# Patient Record
Sex: MALE | RURAL
[De-identification: names, ages, dates, MRNs, and addresses within clinical notes are randomized per-mention and may not be internally consistent; named-entity substitution may affect disease eponyms.]

---

## 2020-10-27 ENCOUNTER — OFFICE VISIT CONVERTED (OUTPATIENT)
Dept: CARDIOLOGY | Facility: CLINIC | Age: 45
End: 2020-10-27
Attending: INTERNAL MEDICINE

## 2021-05-10 NOTE — H&P
History and Physical      Patient Name: Jignesh Grubbs   Patient ID: [id number]   Sex: Male   YOB: 1975    Primary Care Provider: Manju Schwartz MD   Referring Provider: Manju Schwartz MD    Visit Date: October 27, 2020    Provider: Jean Claude Schofield MD   [facility]   Location Address: [address] [place] [address]  [id number]          History Of Present Illness  Consult requested by: Manju Schwartz MD   I saw Jignesh Grubbs in the office today. This is a 45 year old, white male. He has no previous cardiac history. A couple weeks ago he had an episode of dizziness and lightheadedness, and apparently his blood pressure was elevated, and this was associated with increased stress. Symptoms were moderately intense. No clear exacerbating or alleviating factors. Additionally, he reports he feels short of breath since February. At this time he had a self-limited respiratory infection. This has been fairly consistent since that time. He has had no previous cardiac workup.   PAST MEDICAL HISTORY: includes former smoking; arthritis. PAST SURGICAL HISTORY: Negative.   PSYCHOSOCIAL HISTORY: He quit smoking 3 years ago. Rare alcohol. Daily caffeine intake. He is .   FAMILY HISTORY: Positive for heart disease, diabetes and hypertension.   CURRENT MEDICATIONS: include Cyclobenzaprine 10 mg daily; Vistaril 25 mg p.r.n. The dosage and frequency of the medications were reviewed with the patient.   ALLERGIES: Tussins and certain antibiotics.       Review of Systems  · Constitutional  o Admits  o : fatigue, good general health lately, recent weight changes   · Eyes  o Denies  o : double vision, blurred vision  · HENT  o Admits  o : chronic sinus problem  o Denies  o : hearing loss or ringing, swollen glands in neck  · Cardiovascular  o Admits  o : chest pain, palpitations (fast, fluttering, or skipping beats), shortness of breath while walking or lying  flat  o Denies  o : swelling (feet, ankles, hands)  · Respiratory  o Denies  o : chronic or frequent cough, asthma or wheezing, COPD  · Gastrointestinal  o Denies  o : ulcers, nausea or vomiting  · Neurologic  o Admits  o : lightheaded or dizzy  o Denies  o : stroke, headaches  · Musculoskeletal  o Admits  o : joint pain, back pain  · Endocrine  o Denies  o : thyroid disease, diabetes, heat or cold intolerance, excessive thirst or urination  · Heme-Lymph  o Denies  o : bleeding or bruising tendency, anemia      Vitals  Date Time BP Position Site L\R Cuff Size HR RR TEMP (F) WT  HT  BMI kg/m2 BSA m2 O2 Sat FR L/min FiO2 HC       10/27/2020 12:39 /98 Sitting    54 - R   177lbs 8oz 6'   24.07 2.02       10/27/2020 12:39 /97 Sitting    56 - R                   Physical Examination  · Constitutional  o Appearance  o : Normal white male, pleasant, in no acute distress.  · Head and Face  o HEENT  o : No pallor, anicteric. Eyes normal. Moist mucous membranes.  · Neck  o Inspection/Palpation  o : Supple. No hepatosplenomegaly.  o Jugular Veins  o : No JVD. No carotid bruits.  · Respiratory  o Auscultation of Lungs  o : Clear to auscultation bilaterally. No crackles or wheezing.  · Cardiovascular  o Heart  o : S1, S2 is normally heard. No S3. No murmur, rubs, or gallops.  · Gastrointestinal  o Abdominal Examination  o : Soft, non-distended. No palpable hepatosplenomegaly. Bowel sounds heard in all four quadrants.  · Musculoskeletal  o General  o : Normal muscle tone and strength.  · Skin and Subcutaneous Tissue  o General Inspection  o : No skin rashes.  · Extremities  o Extremities  o : Warm and well perfused. Distal pulses present. No pitting pedal edema.     His EKG recently showed sinus bradycardia, rate 51; otherwise normal EKG.    Laboratory studies reviewed.    Primary care records reviewed.           Assessment     1.  Shortness of breath - Has been somewhat chronic since February after he had a  respiratory illness.  His        cardiac exam is normal.  His baseline EKG shows mild sinus bradycardia, which is chronic, but otherwise       normal.  2.  Episode of dizziness and lightheadedness a couple weeks ago - His blood pressure today is mildly elevated.       He has chronic, mild sinus bradycardia, which is not clinically significant.       Plan     The patient will be scheduled for an echocardiogram and treadmill test for a basic cardiac evaluation at this time, and he will have a Holter Monitor, which is already scheduled from his primary care physician in addition to pulmonary function testing.  I will review his diagnostics.  We will discuss the results when available.    It is a pleasure to assist in his care.   Please let me know if you have any questions regarding his case.    Sincerely,         NANCI abraham/[email or web address]           This note was transcribed by Tana Johnson.  dmd/cbd  The above service was transcribed by Tana Johnson, and I attest to the accuracy of the note.  CBD.             Electronically Signed by: Tana Johnson-, -Author on October 29, 2020 06:03:25 AM  Electronically Co-signed by: Jean Claude Schofield MD -Reviewer on October 29, 2020 11:38:09 AM

## 2021-05-14 VITALS
SYSTOLIC BLOOD PRESSURE: 148 MMHG | HEIGHT: 72 IN | BODY MASS INDEX: 24.04 KG/M2 | WEIGHT: 177.5 LBS | HEART RATE: 54 BPM | DIASTOLIC BLOOD PRESSURE: 98 MMHG